# Patient Record
Sex: MALE | Race: WHITE | Employment: OTHER | URBAN - METROPOLITAN AREA
[De-identification: names, ages, dates, MRNs, and addresses within clinical notes are randomized per-mention and may not be internally consistent; named-entity substitution may affect disease eponyms.]

---

## 2017-08-05 RX ORDER — HYDROCHLOROTHIAZIDE 25 MG/1
25 TABLET ORAL DAILY
COMMUNITY

## 2017-08-05 RX ORDER — DOXEPIN HYDROCHLORIDE 50 MG/1
1 CAPSULE ORAL DAILY
COMMUNITY

## 2017-08-05 RX ORDER — FENOFIBRATE 48 MG/1
48 TABLET, COATED ORAL DAILY
COMMUNITY

## 2017-08-05 RX ORDER — LOSARTAN POTASSIUM 100 MG/1
100 TABLET ORAL DAILY
COMMUNITY

## 2017-08-05 RX ORDER — METOPROLOL SUCCINATE 100 MG/1
100 TABLET, EXTENDED RELEASE ORAL DAILY
COMMUNITY

## 2017-08-05 RX ORDER — RANITIDINE 150 MG/1
150 CAPSULE ORAL DAILY
COMMUNITY

## 2017-08-15 ENCOUNTER — ANESTHESIA EVENT (OUTPATIENT)
Dept: SURGERY | Facility: HOSPITAL | Age: 57
DRG: 470 | End: 2017-08-15
Payer: COMMERCIAL

## 2017-08-15 ENCOUNTER — APPOINTMENT (OUTPATIENT)
Dept: GENERAL RADIOLOGY | Facility: HOSPITAL | Age: 57
DRG: 470 | End: 2017-08-15
Attending: NURSE PRACTITIONER
Payer: COMMERCIAL

## 2017-08-15 ENCOUNTER — HOSPITAL ENCOUNTER (INPATIENT)
Facility: HOSPITAL | Age: 57
LOS: 1 days | Discharge: HOME | DRG: 470 | End: 2017-08-16
Attending: ORTHOPAEDIC SURGERY | Admitting: ORTHOPAEDIC SURGERY
Payer: COMMERCIAL

## 2017-08-15 ENCOUNTER — ANESTHESIA (OUTPATIENT)
Dept: SURGERY | Facility: HOSPITAL | Age: 57
DRG: 470 | End: 2017-08-15
Payer: COMMERCIAL

## 2017-08-15 ENCOUNTER — SURGERY (OUTPATIENT)
Age: 57
End: 2017-08-15

## 2017-08-15 DIAGNOSIS — M17.11 PRIMARY OSTEOARTHRITIS OF RIGHT KNEE: Primary | ICD-10-CM

## 2017-08-15 PROBLEM — E11.9 DIABETES MELLITUS, TYPE 2 (HCC): Chronic | Status: ACTIVE | Noted: 2017-08-15

## 2017-08-15 PROBLEM — I10 ESSENTIAL HYPERTENSION: Chronic | Status: ACTIVE | Noted: 2017-08-15

## 2017-08-15 PROBLEM — E78.5 HYPERLIPIDEMIA: Chronic | Status: ACTIVE | Noted: 2017-08-15

## 2017-08-15 LAB
ANTIBODY SCREEN: NEGATIVE
GLUCOSE BLDC GLUCOMTR-MCNC: 123 MG/DL (ref 70–99)
GLUCOSE BLDC GLUCOMTR-MCNC: 128 MG/DL (ref 70–99)
GLUCOSE BLDC GLUCOMTR-MCNC: 132 MG/DL (ref 70–99)
GLUCOSE BLDC GLUCOMTR-MCNC: 164 MG/DL (ref 70–99)
GLUCOSE BLDC GLUCOMTR-MCNC: 99 MG/DL (ref 70–99)
HBA1C MFR BLD: 5.5 % (ref 4–6)
RH BLOOD TYPE: POSITIVE

## 2017-08-15 PROCEDURE — 3E0R3CZ INTRODUCTION OF REGIONAL ANESTHETIC INTO SPINAL CANAL, PERCUTANEOUS APPROACH: ICD-10-PCS | Performed by: ORTHOPAEDIC SURGERY

## 2017-08-15 PROCEDURE — 99232 SBSQ HOSP IP/OBS MODERATE 35: CPT | Performed by: HOSPITALIST

## 2017-08-15 PROCEDURE — 73560 X-RAY EXAM OF KNEE 1 OR 2: CPT | Performed by: NURSE PRACTITIONER

## 2017-08-15 PROCEDURE — 0SRC0J9 REPLACEMENT OF RIGHT KNEE JOINT WITH SYNTHETIC SUBSTITUTE, CEMENTED, OPEN APPROACH: ICD-10-PCS | Performed by: ORTHOPAEDIC SURGERY

## 2017-08-15 DEVICE — ART SURF PROLONG GRN 12MM: Type: IMPLANTABLE DEVICE | Site: KNEE | Status: FUNCTIONAL

## 2017-08-15 DEVICE — NEX ALL-POLY PAT 35MM: Type: IMPLANTABLE DEVICE | Site: KNEE | Status: FUNCTIONAL

## 2017-08-15 DEVICE — CEMENT BONE ZIM PALICOS R +G: Type: IMPLANTABLE DEVICE | Site: KNEE | Status: FUNCTIONAL

## 2017-08-15 DEVICE — IMPLANTABLE DEVICE: Type: IMPLANTABLE DEVICE | Site: KNEE | Status: FUNCTIONAL

## 2017-08-15 DEVICE — NEX PRECT STEMMED TIB PLT SZ 5: Type: IMPLANTABLE DEVICE | Site: KNEE | Status: FUNCTIONAL

## 2017-08-15 RX ORDER — BISACODYL 10 MG
10 SUPPOSITORY, RECTAL RECTAL
Status: DISCONTINUED | OUTPATIENT
Start: 2017-08-15 | End: 2017-08-16

## 2017-08-15 RX ORDER — NALOXONE HYDROCHLORIDE 0.4 MG/ML
0.08 INJECTION, SOLUTION INTRAMUSCULAR; INTRAVENOUS; SUBCUTANEOUS
Status: ACTIVE | OUTPATIENT
Start: 2017-08-15 | End: 2017-08-16

## 2017-08-15 RX ORDER — FAMOTIDINE 20 MG/1
20 TABLET ORAL DAILY
Status: DISCONTINUED | OUTPATIENT
Start: 2017-08-15 | End: 2017-08-16

## 2017-08-15 RX ORDER — METOCLOPRAMIDE 10 MG/1
10 TABLET ORAL ONCE
Status: COMPLETED | OUTPATIENT
Start: 2017-08-15 | End: 2017-08-15

## 2017-08-15 RX ORDER — SODIUM CHLORIDE 9 MG/ML
INJECTION, SOLUTION INTRAVENOUS CONTINUOUS
Status: DISCONTINUED | OUTPATIENT
Start: 2017-08-15 | End: 2017-08-16

## 2017-08-15 RX ORDER — DEXTROSE MONOHYDRATE 25 G/50ML
50 INJECTION, SOLUTION INTRAVENOUS AS NEEDED
Status: DISCONTINUED | OUTPATIENT
Start: 2017-08-15 | End: 2017-08-16

## 2017-08-15 RX ORDER — HYDROCODONE BITARTRATE AND ACETAMINOPHEN 5; 325 MG/1; MG/1
2 TABLET ORAL AS NEEDED
Status: DISCONTINUED | OUTPATIENT
Start: 2017-08-15 | End: 2017-08-15 | Stop reason: HOSPADM

## 2017-08-15 RX ORDER — HYDROCODONE BITARTRATE AND ACETAMINOPHEN 7.5; 325 MG/1; MG/1
2 TABLET ORAL EVERY 6 HOURS PRN
Status: DISPENSED | OUTPATIENT
Start: 2017-08-15 | End: 2017-08-16

## 2017-08-15 RX ORDER — METOPROLOL TARTRATE 5 MG/5ML
2.5 INJECTION INTRAVENOUS ONCE
Status: DISCONTINUED | OUTPATIENT
Start: 2017-08-15 | End: 2017-08-15 | Stop reason: HOSPADM

## 2017-08-15 RX ORDER — ONDANSETRON 2 MG/ML
4 INJECTION INTRAMUSCULAR; INTRAVENOUS ONCE AS NEEDED
Status: ACTIVE | OUTPATIENT
Start: 2017-08-15 | End: 2017-08-15

## 2017-08-15 RX ORDER — MORPHINE SULFATE 1 MG/ML
INJECTION, SOLUTION EPIDURAL; INTRATHECAL; INTRAVENOUS AS NEEDED
Status: DISCONTINUED | OUTPATIENT
Start: 2017-08-15 | End: 2017-08-15 | Stop reason: SURG

## 2017-08-15 RX ORDER — LIDOCAINE HYDROCHLORIDE 10 MG/ML
INJECTION, SOLUTION EPIDURAL; INFILTRATION; INTRACAUDAL; PERINEURAL AS NEEDED
Status: DISCONTINUED | OUTPATIENT
Start: 2017-08-15 | End: 2017-08-15 | Stop reason: SURG

## 2017-08-15 RX ORDER — HALOPERIDOL 5 MG/ML
0.25 INJECTION INTRAMUSCULAR ONCE AS NEEDED
Status: DISCONTINUED | OUTPATIENT
Start: 2017-08-15 | End: 2017-08-15 | Stop reason: HOSPADM

## 2017-08-15 RX ORDER — SODIUM CHLORIDE, SODIUM LACTATE, POTASSIUM CHLORIDE, CALCIUM CHLORIDE 600; 310; 30; 20 MG/100ML; MG/100ML; MG/100ML; MG/100ML
INJECTION, SOLUTION INTRAVENOUS CONTINUOUS
Status: DISCONTINUED | OUTPATIENT
Start: 2017-08-15 | End: 2017-08-16

## 2017-08-15 RX ORDER — ACETAMINOPHEN 325 MG/1
650 TABLET ORAL ONCE
Status: COMPLETED | OUTPATIENT
Start: 2017-08-15 | End: 2017-08-15

## 2017-08-15 RX ORDER — BUPIVACAINE HYDROCHLORIDE 2.5 MG/ML
INJECTION, SOLUTION EPIDURAL; INFILTRATION; INTRACAUDAL AS NEEDED
Status: DISCONTINUED | OUTPATIENT
Start: 2017-08-15 | End: 2017-08-15 | Stop reason: HOSPADM

## 2017-08-15 RX ORDER — HALOPERIDOL 5 MG/ML
0.5 INJECTION INTRAMUSCULAR ONCE AS NEEDED
Status: ACTIVE | OUTPATIENT
Start: 2017-08-15 | End: 2017-08-15

## 2017-08-15 RX ORDER — MORPHINE SULFATE 4 MG/ML
6 INJECTION, SOLUTION INTRAMUSCULAR; INTRAVENOUS EVERY 10 MIN PRN
Status: DISCONTINUED | OUTPATIENT
Start: 2017-08-15 | End: 2017-08-15 | Stop reason: HOSPADM

## 2017-08-15 RX ORDER — DIPHENHYDRAMINE HCL 25 MG
25 CAPSULE ORAL EVERY 4 HOURS PRN
Status: DISCONTINUED | OUTPATIENT
Start: 2017-08-15 | End: 2017-08-16

## 2017-08-15 RX ORDER — BUPIVACAINE HYDROCHLORIDE 7.5 MG/ML
INJECTION, SOLUTION INTRASPINAL AS NEEDED
Status: DISCONTINUED | OUTPATIENT
Start: 2017-08-15 | End: 2017-08-15 | Stop reason: SURG

## 2017-08-15 RX ORDER — HYDROCODONE BITARTRATE AND ACETAMINOPHEN 7.5; 325 MG/1; MG/1
1 TABLET ORAL EVERY 6 HOURS PRN
Status: DISPENSED | OUTPATIENT
Start: 2017-08-15 | End: 2017-08-16

## 2017-08-15 RX ORDER — HYDROMORPHONE HYDROCHLORIDE 1 MG/ML
0.4 INJECTION, SOLUTION INTRAMUSCULAR; INTRAVENOUS; SUBCUTANEOUS
Status: ACTIVE | OUTPATIENT
Start: 2017-08-15 | End: 2017-08-16

## 2017-08-15 RX ORDER — HYDROCODONE BITARTRATE AND ACETAMINOPHEN 10; 325 MG/1; MG/1
1 TABLET ORAL EVERY 4 HOURS PRN
Status: DISCONTINUED | OUTPATIENT
Start: 2017-08-15 | End: 2017-08-16

## 2017-08-15 RX ORDER — ACETAMINOPHEN AND CODEINE PHOSPHATE 300; 30 MG/1; MG/1
1 TABLET ORAL EVERY 4 HOURS PRN
Status: DISCONTINUED | OUTPATIENT
Start: 2017-08-15 | End: 2017-08-16

## 2017-08-15 RX ORDER — SODIUM CHLORIDE 0.9 % (FLUSH) 0.9 %
10 SYRINGE (ML) INJECTION AS NEEDED
Status: DISCONTINUED | OUTPATIENT
Start: 2017-08-15 | End: 2017-08-16

## 2017-08-15 RX ORDER — HYDROCODONE BITARTRATE AND ACETAMINOPHEN 5; 325 MG/1; MG/1
1 TABLET ORAL AS NEEDED
Status: DISCONTINUED | OUTPATIENT
Start: 2017-08-15 | End: 2017-08-15 | Stop reason: HOSPADM

## 2017-08-15 RX ORDER — HYDROCODONE BITARTRATE AND ACETAMINOPHEN 10; 325 MG/1; MG/1
2 TABLET ORAL EVERY 4 HOURS PRN
Status: DISCONTINUED | OUTPATIENT
Start: 2017-08-15 | End: 2017-08-16

## 2017-08-15 RX ORDER — HYDROMORPHONE HYDROCHLORIDE 1 MG/ML
0.6 INJECTION, SOLUTION INTRAMUSCULAR; INTRAVENOUS; SUBCUTANEOUS
Status: ACTIVE | OUTPATIENT
Start: 2017-08-15 | End: 2017-08-16

## 2017-08-15 RX ORDER — HYDROMORPHONE HYDROCHLORIDE 1 MG/ML
0.6 INJECTION, SOLUTION INTRAMUSCULAR; INTRAVENOUS; SUBCUTANEOUS EVERY 5 MIN PRN
Status: DISCONTINUED | OUTPATIENT
Start: 2017-08-15 | End: 2017-08-15 | Stop reason: HOSPADM

## 2017-08-15 RX ORDER — POLYETHYLENE GLYCOL 3350 17 G/17G
17 POWDER, FOR SOLUTION ORAL DAILY PRN
Status: DISCONTINUED | OUTPATIENT
Start: 2017-08-15 | End: 2017-08-16

## 2017-08-15 RX ORDER — ONDANSETRON 2 MG/ML
4 INJECTION INTRAMUSCULAR; INTRAVENOUS ONCE AS NEEDED
Status: DISCONTINUED | OUTPATIENT
Start: 2017-08-15 | End: 2017-08-15 | Stop reason: HOSPADM

## 2017-08-15 RX ORDER — DIPHENHYDRAMINE HYDROCHLORIDE 50 MG/ML
12.5 INJECTION INTRAMUSCULAR; INTRAVENOUS EVERY 4 HOURS PRN
Status: DISCONTINUED | OUTPATIENT
Start: 2017-08-15 | End: 2017-08-16

## 2017-08-15 RX ORDER — ZOLPIDEM TARTRATE 5 MG/1
5 TABLET ORAL NIGHTLY PRN
Status: DISCONTINUED | OUTPATIENT
Start: 2017-08-15 | End: 2017-08-16

## 2017-08-15 RX ORDER — HETASTARCH 6 G/100ML
500 INJECTION, SOLUTION INTRAVENOUS ONCE
Status: COMPLETED | OUTPATIENT
Start: 2017-08-15 | End: 2017-08-15

## 2017-08-15 RX ORDER — ACETAMINOPHEN 325 MG/1
650 TABLET ORAL EVERY 6 HOURS PRN
Status: ACTIVE | OUTPATIENT
Start: 2017-08-15 | End: 2017-08-16

## 2017-08-15 RX ORDER — METOCLOPRAMIDE HYDROCHLORIDE 5 MG/ML
10 INJECTION INTRAMUSCULAR; INTRAVENOUS EVERY 6 HOURS PRN
Status: DISCONTINUED | OUTPATIENT
Start: 2017-08-15 | End: 2017-08-16

## 2017-08-15 RX ORDER — HYDROMORPHONE HYDROCHLORIDE 1 MG/ML
0.8 INJECTION, SOLUTION INTRAMUSCULAR; INTRAVENOUS; SUBCUTANEOUS EVERY 2 HOUR PRN
Status: DISCONTINUED | OUTPATIENT
Start: 2017-08-15 | End: 2017-08-16

## 2017-08-15 RX ORDER — METOPROLOL SUCCINATE 100 MG/1
100 TABLET, EXTENDED RELEASE ORAL DAILY
Status: DISCONTINUED | OUTPATIENT
Start: 2017-08-16 | End: 2017-08-16

## 2017-08-15 RX ORDER — MORPHINE SULFATE 2 MG/ML
2 INJECTION, SOLUTION INTRAMUSCULAR; INTRAVENOUS EVERY 10 MIN PRN
Status: DISCONTINUED | OUTPATIENT
Start: 2017-08-15 | End: 2017-08-15 | Stop reason: HOSPADM

## 2017-08-15 RX ORDER — HYDROMORPHONE HYDROCHLORIDE 1 MG/ML
0.2 INJECTION, SOLUTION INTRAMUSCULAR; INTRAVENOUS; SUBCUTANEOUS EVERY 2 HOUR PRN
Status: DISCONTINUED | OUTPATIENT
Start: 2017-08-15 | End: 2017-08-16

## 2017-08-15 RX ORDER — HYDROMORPHONE HYDROCHLORIDE 1 MG/ML
0.2 INJECTION, SOLUTION INTRAMUSCULAR; INTRAVENOUS; SUBCUTANEOUS EVERY 5 MIN PRN
Status: DISCONTINUED | OUTPATIENT
Start: 2017-08-15 | End: 2017-08-15 | Stop reason: HOSPADM

## 2017-08-15 RX ORDER — EPHEDRINE SULFATE 50 MG/ML
INJECTION, SOLUTION INTRAVENOUS AS NEEDED
Status: DISCONTINUED | OUTPATIENT
Start: 2017-08-15 | End: 2017-08-15 | Stop reason: SURG

## 2017-08-15 RX ORDER — SCOLOPAMINE TRANSDERMAL SYSTEM 1 MG/1
1 PATCH, EXTENDED RELEASE TRANSDERMAL ONCE
Status: DISCONTINUED | OUTPATIENT
Start: 2017-08-15 | End: 2017-08-16

## 2017-08-15 RX ORDER — KETOROLAC TROMETHAMINE 30 MG/ML
30 INJECTION, SOLUTION INTRAMUSCULAR; INTRAVENOUS ONCE
Status: COMPLETED | OUTPATIENT
Start: 2017-08-15 | End: 2017-08-15

## 2017-08-15 RX ORDER — MORPHINE SULFATE 4 MG/ML
4 INJECTION, SOLUTION INTRAMUSCULAR; INTRAVENOUS EVERY 10 MIN PRN
Status: DISCONTINUED | OUTPATIENT
Start: 2017-08-15 | End: 2017-08-15 | Stop reason: HOSPADM

## 2017-08-15 RX ORDER — FAMOTIDINE 10 MG/ML
20 INJECTION, SOLUTION INTRAVENOUS 2 TIMES DAILY
Status: DISCONTINUED | OUTPATIENT
Start: 2017-08-15 | End: 2017-08-16

## 2017-08-15 RX ORDER — FAMOTIDINE 20 MG/1
20 TABLET ORAL ONCE
Status: COMPLETED | OUTPATIENT
Start: 2017-08-15 | End: 2017-08-15

## 2017-08-15 RX ORDER — DOCUSATE SODIUM 100 MG/1
100 CAPSULE, LIQUID FILLED ORAL 2 TIMES DAILY
Status: DISCONTINUED | OUTPATIENT
Start: 2017-08-15 | End: 2017-08-16

## 2017-08-15 RX ORDER — DIPHENHYDRAMINE HYDROCHLORIDE 50 MG/ML
12.5 INJECTION INTRAMUSCULAR; INTRAVENOUS EVERY 4 HOURS PRN
Status: ACTIVE | OUTPATIENT
Start: 2017-08-15 | End: 2017-08-16

## 2017-08-15 RX ORDER — LOSARTAN POTASSIUM 100 MG/1
100 TABLET ORAL DAILY
Status: DISCONTINUED | OUTPATIENT
Start: 2017-08-16 | End: 2017-08-16

## 2017-08-15 RX ORDER — FAMOTIDINE 20 MG/1
20 TABLET ORAL 2 TIMES DAILY
Status: DISCONTINUED | OUTPATIENT
Start: 2017-08-15 | End: 2017-08-16

## 2017-08-15 RX ORDER — DIPHENHYDRAMINE HCL 25 MG
25 CAPSULE ORAL EVERY 4 HOURS PRN
Status: DISPENSED | OUTPATIENT
Start: 2017-08-15 | End: 2017-08-16

## 2017-08-15 RX ORDER — MIDAZOLAM HYDROCHLORIDE 1 MG/ML
INJECTION INTRAMUSCULAR; INTRAVENOUS AS NEEDED
Status: DISCONTINUED | OUTPATIENT
Start: 2017-08-15 | End: 2017-08-15 | Stop reason: SURG

## 2017-08-15 RX ORDER — OXYCODONE HCL 10 MG/1
10 TABLET, FILM COATED, EXTENDED RELEASE ORAL ONCE
Status: COMPLETED | OUTPATIENT
Start: 2017-08-15 | End: 2017-08-15

## 2017-08-15 RX ORDER — SODIUM PHOSPHATE, DIBASIC AND SODIUM PHOSPHATE, MONOBASIC 7; 19 G/133ML; G/133ML
1 ENEMA RECTAL ONCE AS NEEDED
Status: DISCONTINUED | OUTPATIENT
Start: 2017-08-15 | End: 2017-08-16

## 2017-08-15 RX ORDER — CELECOXIB 200 MG/1
200 CAPSULE ORAL ONCE
Status: COMPLETED | OUTPATIENT
Start: 2017-08-15 | End: 2017-08-15

## 2017-08-15 RX ORDER — HYDROMORPHONE HYDROCHLORIDE 1 MG/ML
0.4 INJECTION, SOLUTION INTRAMUSCULAR; INTRAVENOUS; SUBCUTANEOUS EVERY 2 HOUR PRN
Status: DISCONTINUED | OUTPATIENT
Start: 2017-08-15 | End: 2017-08-16

## 2017-08-15 RX ORDER — OXYCODONE HCL 10 MG/1
10 TABLET, FILM COATED, EXTENDED RELEASE ORAL EVERY 12 HOURS
Status: DISCONTINUED | OUTPATIENT
Start: 2017-08-15 | End: 2017-08-16

## 2017-08-15 RX ORDER — HYDROCHLOROTHIAZIDE 25 MG/1
25 TABLET ORAL DAILY
Status: DISCONTINUED | OUTPATIENT
Start: 2017-08-16 | End: 2017-08-16

## 2017-08-15 RX ORDER — MAGNESIUM HYDROXIDE 1200 MG/15ML
LIQUID ORAL CONTINUOUS PRN
Status: DISCONTINUED | OUTPATIENT
Start: 2017-08-15 | End: 2017-08-15

## 2017-08-15 RX ORDER — CELECOXIB 200 MG/1
200 CAPSULE ORAL EVERY 12 HOURS
Status: DISCONTINUED | OUTPATIENT
Start: 2017-08-16 | End: 2017-08-16

## 2017-08-15 RX ORDER — NALBUPHINE HCL 10 MG/ML
2.5 AMPUL (ML) INJECTION EVERY 4 HOURS PRN
Status: DISPENSED | OUTPATIENT
Start: 2017-08-15 | End: 2017-08-16

## 2017-08-15 RX ORDER — NALOXONE HYDROCHLORIDE 0.4 MG/ML
80 INJECTION, SOLUTION INTRAMUSCULAR; INTRAVENOUS; SUBCUTANEOUS AS NEEDED
Status: DISCONTINUED | OUTPATIENT
Start: 2017-08-15 | End: 2017-08-15 | Stop reason: HOSPADM

## 2017-08-15 RX ORDER — ONDANSETRON 2 MG/ML
4 INJECTION INTRAMUSCULAR; INTRAVENOUS EVERY 4 HOURS PRN
Status: DISCONTINUED | OUTPATIENT
Start: 2017-08-15 | End: 2017-08-16

## 2017-08-15 RX ORDER — HYDROMORPHONE HYDROCHLORIDE 1 MG/ML
0.4 INJECTION, SOLUTION INTRAMUSCULAR; INTRAVENOUS; SUBCUTANEOUS EVERY 5 MIN PRN
Status: DISCONTINUED | OUTPATIENT
Start: 2017-08-15 | End: 2017-08-15 | Stop reason: HOSPADM

## 2017-08-15 RX ADMIN — MIDAZOLAM HYDROCHLORIDE 2 MG: 1 INJECTION INTRAMUSCULAR; INTRAVENOUS at 07:00:00

## 2017-08-15 RX ADMIN — EPHEDRINE SULFATE 10 MG: 50 INJECTION, SOLUTION INTRAVENOUS at 07:22:00

## 2017-08-15 RX ADMIN — EPHEDRINE SULFATE 10 MG: 50 INJECTION, SOLUTION INTRAVENOUS at 07:18:00

## 2017-08-15 RX ADMIN — SODIUM CHLORIDE, SODIUM LACTATE, POTASSIUM CHLORIDE, CALCIUM CHLORIDE: 600; 310; 30; 20 INJECTION, SOLUTION INTRAVENOUS at 07:00:00

## 2017-08-15 RX ADMIN — BUPIVACAINE HYDROCHLORIDE 1.6 ML: 7.5 INJECTION, SOLUTION INTRASPINAL at 07:06:00

## 2017-08-15 RX ADMIN — EPHEDRINE SULFATE 10 MG: 50 INJECTION, SOLUTION INTRAVENOUS at 07:52:00

## 2017-08-15 RX ADMIN — MORPHINE SULFATE 0.3 MG: 1 INJECTION, SOLUTION EPIDURAL; INTRATHECAL; INTRAVENOUS at 07:06:00

## 2017-08-15 RX ADMIN — EPHEDRINE SULFATE 10 MG: 50 INJECTION, SOLUTION INTRAVENOUS at 07:28:00

## 2017-08-15 RX ADMIN — SODIUM CHLORIDE, SODIUM LACTATE, POTASSIUM CHLORIDE, CALCIUM CHLORIDE: 600; 310; 30; 20 INJECTION, SOLUTION INTRAVENOUS at 08:19:00

## 2017-08-15 RX ADMIN — LIDOCAINE HYDROCHLORIDE 25 MG: 10 INJECTION, SOLUTION EPIDURAL; INFILTRATION; INTRACAUDAL; PERINEURAL at 07:10:00

## 2017-08-15 RX ADMIN — SODIUM CHLORIDE, SODIUM LACTATE, POTASSIUM CHLORIDE, CALCIUM CHLORIDE: 600; 310; 30; 20 INJECTION, SOLUTION INTRAVENOUS at 08:43:00

## 2017-08-15 NOTE — ADDENDUM NOTE
Addendum  created 08/15/17 0944 by Kaykay Cabrera CRNA    Anesthesia Intra Meds edited, Orders acknowledged in Narrator

## 2017-08-15 NOTE — PROGRESS NOTES
[facility]    Progress Note    Fresnoshari Hoff Patient Status:  Inpatient    1960 MRN [id number]   Location [facility] 4W/SW/SE Attending Sandford Duane, MD   Hosp Day # 0 PCP No primary care provider on file.      Subjec Results:   No results found for: WBC, HGB, HCT, PLT, CREATSERUM, BUN, NA, K, CL, CO2, GLU, CA, ALB, ALKPHO, BILT, TP, AST, ALT, PTT, INR, PT, T4F, TSH, TSHREFLEX, MICHEAL, LIP, GGT, PSA, DDIMER, ESRML, ESRPF, CRP, BNP, MG, PHOS, TROP, CK, CKMB, RIN, RP
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
## 2017-08-15 NOTE — ANESTHESIA POSTPROCEDURE EVALUATION
Patient: Fariha Rodriguez    Procedure Summary     Date:  08/15/17 Room / Location:  [address] [place] [address] MAIN OR 05 / [address] [place] [address] MAIN OR    Anesthesia Start:  0700 Anesthesia Stop:  8621    Procedure:  KNEE TOTAL REPLACEMENT (Right Knee) Diagnosis:  (Degenerative joint disease

## 2017-08-15 NOTE — PLAN OF CARE
Diabetes/Glucose Control    • Glucose maintained within prescribed range Progressing        DISCHARGE PLANNING    • Discharge to home or other facility with appropriate resources Progressing        HEMATOLOGIC - ADULT    • Maintains hematologic stability P

## 2017-08-15 NOTE — ANESTHESIA PROCEDURE NOTES
Spinal Block  Performed by: Digna Singh by: Jory Pastor     Patient Location:  OR  Start Time:  8/15/2017 7:03 AM  End Time:  8/15/2017 7:08 AM  Site identification: surface landmarks    Reason for Block: surgical anesthesia

## 2017-08-15 NOTE — ANESTHESIA PREPROCEDURE EVALUATION
Anesthesia PreOp Note    HPI:     Elby Apgar is a 56year old male who presents for preoperative consultation requested by: Rohan Allen MD    Date of Surgery: 8/15/2017    Procedure(s):  KNEE TOTAL REPLACEMENT  Indication: Degenerative joint di NP    [MAR Hold] bupivacaine liposome (EXPAREL) 1.3 % injection SUSP 20 mL 20 mL Infiltration Once Violetta Weinberg MD    [facility] Hold] Tranexamic Acid (CYKLOKAPRON) 1,000 mg in sodium chloride 0.9 % 50 mL IRRIGATION ONLY (NOT FOR IV USE) 1,000 mg Intra-artic ROS     GI/Hepatic/Renal    (+) GERD well controlled,     Endo/Other    (+) diabetes mellitus type 2 well controlled,   Abdominal  - normal exam             Anesthesia Plan:   ASA:  3  Plan:   Spinal  Post-op Pain Management: Intrathecal narcotics  Informe

## 2017-08-15 NOTE — OPERATIVE REPORT
[facility]    TKA Brief Operative Note    Su Paul Patient Status:  Surgery Admit    1960 MRN [id number]   Location [place] [address] Attending Abdullahi Almazan MD     PCP No primary care provider on file.

## 2017-08-15 NOTE — OPERATIVE REPORT
[address], [place] [address]?[phone]\S OPERATIVE REPORT\[id number] NAME: Karla Fonseca   MR#: [id number]   PMD:     DATE OF PROCEDURE: 15-Aug-2017   PREOPERATIVE DIAGNOSIS: Degenerative Arthritis right kn clinical and radiographic findings. We reviewed the risks, benefits and alternatives to the procedure and informed consent was obtained.  The risks discussed included, but were not limited, to infection, nerve injury (peroneal, tibial and skin branches of t medial and lateral menisci, the anterior cruciate ligament (ACL) and a portion of the patellar fat pad.  We completed our medial release by dissecting from the midline around to the posteriomedial corner in a subperiosteal fashion along the tibia and remove at approximately 3 degrees of external rotation into the distal femur based on the posterior condylar axis.  This lined up nicely with Priya's line and we then placed the gold pins in the drill holes and placed the four-in-one cutting block over the pin was fully hardened the patella clamp was removed and the knee was taken though a full range of motion. Excellent range of motion and good stability was noted, throughout the entire arc of motion with the 12mm trial insert in place.  Therefore, the wound was Reason: I have reviewed and approve this document.  Date: [id number] S: 8/15/2017 6:47:34 PM         DISTRIBUTION LIST:     Keoyn Hale MD

## 2017-08-15 NOTE — DISCHARGE PLANNING
JOVANNA met with the pt. At bedside. The pt. Lives with his wife in a 2 level home with his bedroom on the main level. The pt. Reports being independent prior to admission with adls, ambulation and driving. The pt. Has 2 children living at home. The pt.  Is

## 2017-08-15 NOTE — H&P
History & Physical Examination    Patient Name: Ernesto Carvalho  MRN: [id number]  [facility]: [id number]  YOB: 1960    Diagnosis: R knee DJD    Present Illness: Ernesto Carvalho is a 56year old yo male with a history of R knee end stage DJD.   Cl Diagnosis Date   • Diabetes ([facility] [address]     diabetes for 6-8 months   • Esophageal reflux    • High blood pressure    • High cholesterol      Past Surgical History:  No date: ARTHROSCOPY OF JOINT UNLISTED      Comment: bilateral  History reviewed.  No pertinent

## 2017-08-16 VITALS
HEIGHT: 72 IN | HEART RATE: 100 BPM | DIASTOLIC BLOOD PRESSURE: 83 MMHG | BODY MASS INDEX: 31.42 KG/M2 | RESPIRATION RATE: 18 BRPM | OXYGEN SATURATION: 93 % | TEMPERATURE: 98 F | WEIGHT: 232 LBS | SYSTOLIC BLOOD PRESSURE: 137 MMHG

## 2017-08-16 LAB
ANION GAP SERPL CALC-SCNC: 5 MMOL/L (ref 0–18)
BUN SERPL-MCNC: 6 MG/DL (ref 8–20)
BUN/CREAT SERPL: 7.8 (ref 10–20)
CALCIUM SERPL-MCNC: 8.4 MG/DL (ref 8.5–10.5)
CHLORIDE SERPL-SCNC: 102 MMOL/L (ref 95–110)
CO2 SERPL-SCNC: 25 MMOL/L (ref 22–32)
CREAT SERPL-MCNC: 0.77 MG/DL (ref 0.5–1.5)
ERYTHROCYTE [DISTWIDTH] IN BLOOD BY AUTOMATED COUNT: 13.1 % (ref 11–15)
GLUCOSE BLDC GLUCOMTR-MCNC: 143 MG/DL (ref 70–99)
GLUCOSE SERPL-MCNC: 114 MG/DL (ref 70–99)
HCT VFR BLD AUTO: 33.7 % (ref 41–52)
HGB BLD-MCNC: 12 G/DL (ref 13.5–17.5)
MCH RBC QN AUTO: 33.4 PG (ref 27–32)
MCHC RBC AUTO-ENTMCNC: 35.7 G/DL (ref 32–37)
MCV RBC AUTO: 93.6 FL (ref 80–100)
OSMOLALITY UR CALC.SUM OF ELEC: 272 MOSM/KG (ref 275–295)
PLATELET # BLD AUTO: 163 K/UL (ref 140–400)
PMV BLD AUTO: 7.3 FL (ref 7.4–10.3)
POTASSIUM SERPL-SCNC: 3.8 MMOL/L (ref 3.3–5.1)
RBC # BLD AUTO: 3.6 M/UL (ref 4.5–5.9)
SODIUM SERPL-SCNC: 132 MMOL/L (ref 136–144)
WBC # BLD AUTO: 7.5 K/UL (ref 4–11)

## 2017-08-16 PROCEDURE — 99239 HOSP IP/OBS DSCHRG MGMT >30: CPT | Performed by: HOSPITALIST

## 2017-08-16 NOTE — PROGRESS NOTES
08/15/17 1919   Provider Notification   Reason for Communication Other (comment)   Provider Name    Method of Communication Call   Response Phone call   Notification Time 1919   drain is putting out 550ml since arriving to the unit.  Dr. Kathy Fleming

## 2017-08-16 NOTE — DISCHARGE PLANNING
JOVANNA notified [facility] of the pt.'s discharge and send Ruth Ann 78 orders.       One [address] [place] [address]

## 2017-08-16 NOTE — PHYSICAL THERAPY NOTE
PHYSICAL THERAPY KNEE TREATMENT NOTE - INPATIENT     Room Number: 428/428-A             Presenting Problem: R TKA    Problem List  Principal Problem:    Arthritis of right knee  Active Problems:    Diabetes mellitus, type 2 (HCC)    Hyperlipidemia    Esse Score:  Raw Score: 20   PT Approx Degree of Impairment Score: 35.83%   Standardized Score (AM-PAC Scale): 47.67   CMS Modifier (G-Code): CJ    FUNCTIONAL ABILITY STATUS  Gait Assessment   Gait Assistance: Supervision  Distance (ft): 200 x 2  Assistive [place]

## 2017-08-16 NOTE — DISCHARGE SUMMARY
[facility]    Discharge Summary    Barak Wong Patient Status:  Inpatient    1960 MRN [id number]   Location [facility] 4W/SW/SE Attending No att. providers found   [place] Day # 1 PCP No primary care provider on file. ([address]  CONT HOME MEDS, MONITOR ACCU CHECKS.        Hyperlipidemia  CONT HOME MEDS,        Essential hypertension  CONT HOME MEDS, MONITOR.          Consultations: Dr Yuki Sol     Procedures: Right knee arthroplasty     Complications: none    Discharge [facility]

## 2017-08-16 NOTE — PROGRESS NOTES
[facility]    Progress Note    Tiana Varela Patient Status:  Inpatient    1960 MRN [id number]   Location [facility] 4W/SW/SE Attending Whit Nunez MD   Hosp Day # 1 PCP No primary care provider on file.        Subjec 8/15/2017  CONCLUSION:  1. Status post recent right knee arthroplasty with prosthetic components in anatomic position. 2. No adverse features. Postoperative soft tissue emphysema.  Surgical drain visualized                 Laura Tamez MD  [facility]

## 2017-08-16 NOTE — PLAN OF CARE
Diabetes/Glucose Control    • Glucose maintained within prescribed range Completed        DISCHARGE PLANNING    • Discharge to home or other facility with appropriate resources Completed        HEMATOLOGIC - ADULT    • Maintains hematologic stability Compl

## 2017-08-16 NOTE — ANESTHESIA POST-OP FOLLOW-UP NOTE
[facility] - [facility]   Acute Pain Rounds Note  2017    Patient name: Almas Hoff 56year old male  : 1960  MRN: [id number]    Diagnosis: (M17.11) Primary osteoarthritis of right knee  (primary encounter diagnosis)        S/P:     P

## 2017-08-16 NOTE — OCCUPATIONAL THERAPY NOTE
OCCUPATIONAL THERAPY EVALUATION - INPATIENT      Room Number: 428/428-A  Evaluation Date: 8/16/2017  Type of Evaluation: Initial  Presenting Problem:  (R TKA)    Physician Order: IP Consult to Occupational Therapy  Reason for Therapy: ADL/IADL Dysfunction officer)  Hand Dominance: Right  Drives: Yes  Patient Regularly Uses:  (Contacts)    Stairs in Home: 5STE; 2 rails, can stay on main level  Assistive Device(s) Used: Used crutches past few months due to an injury     Prior Level of [place]: Pt reports w/RW  Shower Transfer: n/t   Chair Transfer: Supervision w/RW  Car Transfer: n/t; discussed safe car transfer techniques    Bedroom Mobility: Supervision w/RW      FUNCTIONAL ADL ASSESSMENT  Grooming: set up  Bathing: n/t   Toileting: supervision  [place]

## 2017-08-17 ENCOUNTER — TELEPHONE (OUTPATIENT)
Dept: MEDSURG UNIT | Facility: HOSPITAL | Age: 57
End: 2017-08-17

## 2017-08-17 NOTE — PAYOR COMM NOTE
REF# [id number]    Aura Patel #[id number]   Admission Info: Inpatient (Adm: 08/15/17)   Hospital Account: [de-identified]   Description: 56year old M Primary Service: Surgical Unit Info: [address] [place] [address]   History and Physical     H&P signed by Sarah Beth Salazar scopolamine (TRANSDERM-SCOP) 1.5 mg patch 1 patch Transdermal Once   [MAR Hold] CeFAZolin Sodium (ANCEF/KEFZOL) IVPB premix 2 g 2 g Intravenous Once   [MAR Hold] bupivacaine liposome (EXPAREL) 1.3 % injection SUSP 20 mL 20 mL Infiltration Once   [MAR Hold] []Manual[]Template  []Copied          [address], [facility] [place] [address]?[phone]\J OPERATIVE REPORT\[id number] NAME: Harini Syed   MR#: [id number]   PMD:           DATE OF PROCEDURE: 15-Aug-2017   PREOPERATIV INDICATIONS: The patient is a 68-year-old man who presented to the office with progressively worsening right knee pain.  His pain was refractory to all forms on non-operative management including nonsteroidal anti-inflammatory agents, activity modification We then used an Esmarch to exsanguiate the lower extremity and elevated the tourniquet to 250 mmHg.midline incision was made over the patient`s right knee, dissecting down through the dermal and epidermal layers into the subcutaneous tissue.  Bovie cautery the tibia. Resection was made using the sagittal saw, taking care to leave a bone island posteriorly for the posterior cruciate ligament (PCL).  Once this was complete, we then irrigated the wound with pulsatile lavage and placed a laminar  medially final component. trial components were removed and we opened up the final implants. The wound was irrigated with pulsatile lavage and the Palacos cement was mixed.  The NexGen Size 5, PreCoat Stemmed tibial tray was cemented into place followed by the NexGen hooked up and an Ace bandage wrapped from toe to thigh. All needle and sponge counts were correct prior to leaving the operating room. The patient was aroused in the operating room and taken to the recovery room in stable condition. , he will be weight bear Lower Extremity Sensory:                         Tibial Nerve: intact to light touch                         Superficial Peroneal Nerve: intact to light touch                         Deep Peroneal Nerve: intact to light touch     Pulses: : Juan Chung MD (Physician)   []Manual[]Template  []Copied     [facility]     Progress Note           Efrain Wong Patient Status:  Inpatient    1960 MRN [id number]   Location [facility] 4W/SW/SE Attending Ba Hyperlipidemia  CONT HOME MEDS,        Essential hypertension  CONT HOME MEDS, MONITOR.            Results:   No results found for: WBC, HGB, HCT, PLT, CREATSERUM, BUN, NA, K, CL, CO2, GLU, CA, ALB, ALKPHO, BILT, TP, AST, ALT, PTT, INR, PT, T4F, TSH, TSH Please note that only DMG and EMG patients enrolled in the Medicare ACO, [facility] ACO and [address] [place] [address] will be handled by a member of the Care Management Team.  For all other patients, please follow usual protocol for discharge care transi Commonly known as:  HYDRODIURIL    Take 25 mg by mouth daily. Refills:  0   losartan 100 MG Tabs  Commonly known as:  COZAAR    Take 100 mg by mouth daily.  Refills:  0   MetFORMIN HCl 850 MG Tabs  Commonly known as:  GLUCOPHAGE    Take 850 mg by mouth 2 (t

## 2024-06-11 ENCOUNTER — ORDER TRANSCRIPTION (OUTPATIENT)
Dept: ADMINISTRATIVE | Facility: HOSPITAL | Age: 64
End: 2024-06-11

## 2024-06-11 DIAGNOSIS — Z13.6 SCREENING FOR CARDIOVASCULAR CONDITION: Primary | ICD-10-CM

## 2024-07-11 ENCOUNTER — HOSPITAL ENCOUNTER (OUTPATIENT)
Dept: CT IMAGING | Age: 64
Discharge: HOME | End: 2024-07-11

## 2024-07-11 ENCOUNTER — ORDER TRANSCRIPTION (OUTPATIENT)
Dept: ADMINISTRATIVE | Facility: HOSPITAL | Age: 64
End: 2024-07-11

## 2024-07-11 DIAGNOSIS — Z13.9 ENCOUNTER FOR SCREENING: Primary | ICD-10-CM

## 2024-07-11 DIAGNOSIS — Z13.6 SCREENING FOR HEART DISEASE: ICD-10-CM

## 2024-07-11 LAB
POCT GLUCOSE CHOLESTECH: 212 (ref 70–140)
POCT HDL: 47 (ref 45–60)
POCT LDL: 76 (ref 0–99)
POCT TOTAL CHOLESTEROL: 151 (ref 110–200)
POCT TRIGLYCERIDES: 135 (ref 1–149)

## 2024-07-11 NOTE — PROGRESS NOTES
Date of Service 7/11/2024    HILTON TUCKER  Date of Birth 1/28/1960    Patient Age: 64 year old    PCP: Juan Pablo Scott MD  [address] [facility] [address]    Heart Scan Consult  Preliminary Heart Scan Score: 451    Previous Screening  Heart Scan Completed Previously: No        Peripheral Vascular Scan Completed Previously: No          Risk Factors  Personal Risk Factors  Alterable Risk Factors: High Blood Pressure;Diabetes      Body Mass Index  BMI 34    Blood Pressure  /77 on meds.  (Normal =< 120/80,  Elevated = 120-129/ >80,  High Stage1 130-139/80-89 , Stage2 >140/>90)    Lipid Profile  Patient was in fasting state: No    Cholesterol: 151, done on 7/11/2024.  HDL Cholesterol: 47, done on 7/11/2024.  LDL Cholesterol: 76, done on 7/11/2024.  TriGlycerides 135, done on 7/11/2024.  Not on med.    Cholesterol Goals  Value   Total  =< 200   HDL  = > 45 Men = > 55 Women   LDL   =< 100   Triglycerides  =< 150       Glucose and Hemoglobin A1C Type II on metformin. A1c 6.5  May 2023  Lab Results   Component Value Date     (H) 08/16/2017    A1C 5.5 08/15/2017     (Normal Fasting Glucose < 100mg/dl )    Nurse Review  Risk factor information and results reviewed with Nurse: Yes    Recommended Follow Up:  Consult your physician regarding:: Final Heart Scan Report;Discuss potential for Incidental Finding      Recommendations for Change:  Nutrition Changes: Low Saturated Fat    Cholesterol Modification (goal of therapy depends upon your risk): No Change Needed    Exercise: Enhance Current Program    Smoking Cessation: > 1 Year Ago    Weight Management: Decrease Current Weight    Stress Management: Adopt Stress Management Techniques    Repeat Heart Scan: 3 Years if Calcium Score is > 0.0;Discuss with your Physician              [place] Recommended Resources:  Recommended Resources: PV Screening;Upcoming Classes, Medical Services and Health Library [email or web address]  Recommended PV Screening:  Abdomen;Carotids         Paige ARTEAGA, RN        Please Contact the Nurse Heart Line with any Questions or Concerns [phone].

## 2024-07-26 ENCOUNTER — HOSPITAL ENCOUNTER (OUTPATIENT)
Dept: ULTRASOUND IMAGING | Age: 64
Discharge: HOME | End: 2024-07-26
Attending: INTERNAL MEDICINE

## 2024-07-26 DIAGNOSIS — Z13.6 SCREENING FOR HEART DISEASE: ICD-10-CM

## (undated) DEVICE — KIT DRN 1/8IN PVC 3 SPRG EVAC

## (undated) DEVICE — 3M™ TEGADERM™ TRANSPARENT FILM DRESSING, 1626W, 4 IN X 4-3/4 IN (10 CM X 12 CM), 50 EACH/CARTON, 4 CARTON/CASE: Brand: 3M™ TEGADERM™

## (undated) DEVICE — CONTAINER SPEC STR 4OZ GRY LID

## (undated) DEVICE — 60 ML SYRINGE LUER-LOCK TIP: Brand: MONOJECT

## (undated) DEVICE — T5 HOOD WITH PEEL AWAY FACE SHIELD

## (undated) DEVICE — ZIMMER® STERILE DISPOSABLE TOURNIQUET CUFF WITH PLC, DUAL PORT, SINGLE BLADDER, 34 IN. (86 CM)

## (undated) DEVICE — DUAL CUT SAGITTAL BLADE

## (undated) DEVICE — Device

## (undated) DEVICE — SOL  .9 3000ML

## (undated) DEVICE — TOTAL KNEE: Brand: MEDLINE INDUSTRIES, INC.

## (undated) DEVICE — STERILE LATEX POWDER-FREE SURGICAL GLOVESWITH NITRILE COATING: Brand: PROTEXIS

## (undated) DEVICE — TRAY SRGPRP PVP IOD WT SCRB SM

## (undated) DEVICE — BLADE 11 SHRP BP SS SRG STRL

## (undated) DEVICE — FAN SPRAY KIT: Brand: PULSAVAC®

## (undated) DEVICE — Device: Brand: JELCO

## (undated) DEVICE — 2T8 #2 PDO 24 X 24: Brand: 2T8 #2 PDO 24 X 24

## (undated) DEVICE — BLADE SAW SAGITTAL 19.5

## (undated) DEVICE — POLAR CARE CUBE COOLING UNIT

## (undated) DEVICE — DERMABOND LIQUID ADHESIVE

## (undated) DEVICE — DRAPE SHEET LG

## (undated) DEVICE — PAD THRP 16IN WRPON MU LNG STM

## (undated) DEVICE — 20 ML SYRINGE LUER-LOCK TIP: Brand: MONOJECT

## (undated) DEVICE — BATTERY

## (undated) DEVICE — NEEDLE HPO 18GA 1.5IN ECLPS

## (undated) DEVICE — STERILE POLYISOPRENE POWDER-FREE SURGICAL GLOVES: Brand: PROTEXIS

## (undated) DEVICE — SOLUTION SURG DURA PREP HAZMAT

## (undated) DEVICE — COTTON UNDERCAST PADDING,REGULAR FINISH: Brand: WEBRIL

## (undated) DEVICE — SOL  .9 1000ML BTL

## (undated) DEVICE — GOWN SURG AERO BLUE PERF XLG

## (undated) DEVICE — GAUZE SPONGES,12 PLY: Brand: CURITY

## (undated) DEVICE — 2T9 -0- UNDYED MONODERM 36X36: Brand: 2T9 -0- UNDYED MONODERM 36X36

## (undated) DEVICE — NON-ADHERENT PADS PREPACK: Brand: TELFA

## (undated) DEVICE — SYSTEM BN CMNT OPTVC 2 MX KT

## (undated) DEVICE — STERILE LATEX POWDER-FREE SURGICAL GLOVES WITH HYDROGEL COATING, SMOOTH FINISH, STRAIGHT FINGER: Brand: PROTEXIS